# Patient Record
Sex: MALE | Race: WHITE | NOT HISPANIC OR LATINO | URBAN - METROPOLITAN AREA
[De-identification: names, ages, dates, MRNs, and addresses within clinical notes are randomized per-mention and may not be internally consistent; named-entity substitution may affect disease eponyms.]

---

## 2024-10-15 ENCOUNTER — EMERGENCY (EMERGENCY)
Facility: HOSPITAL | Age: 29
LOS: 0 days | Discharge: HOME | End: 2024-10-15
Attending: EMERGENCY MEDICINE
Payer: COMMERCIAL

## 2024-10-15 VITALS
HEART RATE: 80 BPM | SYSTOLIC BLOOD PRESSURE: 150 MMHG | DIASTOLIC BLOOD PRESSURE: 91 MMHG | RESPIRATION RATE: 18 BRPM | WEIGHT: 282.19 LBS | OXYGEN SATURATION: 98 % | TEMPERATURE: 98 F

## 2024-10-15 DIAGNOSIS — K08.89 OTHER SPECIFIED DISORDERS OF TEETH AND SUPPORTING STRUCTURES: ICD-10-CM

## 2024-10-15 PROCEDURE — 99283 EMERGENCY DEPT VISIT LOW MDM: CPT

## 2024-10-15 RX ORDER — AMOXICILLIN 250 MG/5ML
1 SUSPENSION, RECONSTITUTED, ORAL (ML) ORAL
Qty: 14 | Refills: 0
Start: 2024-10-15 | End: 2024-10-21

## 2024-10-15 RX ORDER — ACETAMINOPHEN 325 MG
650 TABLET ORAL ONCE
Refills: 0 | Status: COMPLETED | OUTPATIENT
Start: 2024-10-15 | End: 2024-10-15

## 2024-10-15 RX ADMIN — Medication 650 MILLIGRAM(S): at 15:19

## 2024-10-15 NOTE — ED PROVIDER NOTE - OBJECTIVE STATEMENT
Pt is a 29 y.o Male with no significant PMHx who presents to the ED with chief complaint of left upper wisdom tooth pain. Denies any fever chills, trauma facial swelling difficulty swallowing or speaking, trismus cp sob, neck pain abdominal pain n/v. Pt states he took Naproxen 500 approximately 2 hours pta with minimal relief in his dental pain.

## 2024-10-15 NOTE — ED PROVIDER NOTE - PATIENT PORTAL LINK FT
You can access the FollowMyHealth Patient Portal offered by [facility] by registering at the following website: [email or web address]. By joining [employer]’s FollowMyHealth portal, you will also be able to view your health information using other applications (apps) compatible with our system.

## 2024-10-15 NOTE — ED PROVIDER NOTE - NSFOLLOWUPCLINICS_GEN_ALL_ED_FT
[facility]  Dental  [address] [place] [id number]  Phone: [phone]  Fax:

## 2024-10-15 NOTE — ED PROVIDER NOTE - CLINICAL SUMMARY MEDICAL DECISION MAKING FREE TEXT BOX
VSS.  No signs of sepsis.  Tolerating PO.  left upper dental pain x 1 month.  No acute ED intervention.  Abx, NSAIDs and F/U with Dental. Strict return instructions discussed.

## 2024-10-15 NOTE — ED PROVIDER NOTE - NSFOLLOWUPINSTRUCTIONS_ED_ALL_ED_FT
You visited the Emergency Department for a dental issue. We recommend that you follow up with a dentist. If you have a private dentist, please contact them directly. If you do not have a dentist, we have a dental clinic that will do its best to accommodate you. To make an appointment, please call the dental clinic at [phone] and leave a voicemail or email them at [email or web address] for a response.     Dental Pain: What It Means  A close-up view of two teeth. One tooth is normal, and the other has tooth decay and an abscess.  Dental pain is often a sign that something is wrong with your teeth or gums. You can also have pain after a dental treatment. If you have dental pain, it's important to contact your dental care provider, especially if you don't know what's causing the pain.    Dental pain may hurt a lot or a little. It can be caused by many things, including:  Tooth decay. This is also called cavities or caries.  Infection.  An abscess. This is when the inner part of the tooth is filled with pus.  An injury or a crack in the tooth.  Gum disease or gums that move back and expose the root of a tooth.  Abnormal grinding or clenching of teeth.  Not taking good care of your teeth.  Sometimes the cause of pain isn't known.    You may have pain all the time, or it may come and go. It may happen only when you're:  Chewing.  Exposed to hot or cold temperatures.  Eating or drinking foods or drinks with a lot of sugar in them, such as soda or candy.  Follow these instructions at home:  Medicines    Take your medicines only as told.  If you were given antibiotics, take them as told. Do not stop taking them even if you start to feel better.  Eating and drinking    Avoid foods or drinks that cause you pain. These may include:  Very hot or very cold foods or drinks.  Sweet or sugary foods or drinks.  Managing pain and swelling    Bag of ice on a towel on the skin.  Use ice or an ice pack on the painful area of your face as told.  Place a towel between your skin and the ice.  Leave the ice on for 20 minutes, 2–3 times a day.  If your skin turns red, take off the ice right away to prevent skin damage. The risk of damage is higher if you can't feel pain, heat, or cold.  Brushing and flossing    Brush your teeth twice a day using a fluoride toothpaste.  Use a toothpaste made for sensitive teeth as told by your dental care provider.  Always use a soft toothbrush. This will help prevent irritation of your gums.  Floss your teeth at least once a day.  General instructions    Do not apply heat to the outside of your face.  To cleanse your mouth and help with any irritation and swelling in the painful area, you can try rinsing with salt water.  Swish and gargle with salt water and then spit it out. To make salt water, add a half to a whole spoonful of salt to a glass of warm water. Mix well.  You can repeat this every 2–3 hours for pain.  Contact a dental care provider if:  You have dental pain and you don't know why.  Your pain isn't controlled with medicines.  Your symptoms get worse.  You have new symptoms.  Get help right away if:  You can't open your mouth.  You're having trouble breathing or swallowing.  You have a fever.  Your face, neck, or jaw is swollen.  These symptoms may be an emergency. Call 911 right away.  Do not wait to see if the symptoms will go away.  Do not drive yourself to the hospital.  This information is not intended to replace advice given to you by your health care provider. Make sure you discuss any questions you have with your health care provider.

## 2024-10-15 NOTE — ED PROVIDER NOTE - MDM ORDERS SUBMITTED SELECTION
I went patient MyChart message back answering her question.    Jennifer Dalton, DNP, APRN, CNP    Medications

## 2024-10-15 NOTE — ED PROVIDER NOTE - PHYSICAL EXAMINATION
VITAL SIGNS: noted  CONSTITUTIONAL: Well-developed; well-nourished; in no acute distress  HEAD: Normocephalic; atraumatic  EYES: PERRL, EOM intact; conjunctiva and sclera clear  ENT: No nasal discharge;, MMM, oropharynx clear without tonsillar hypertrophy or exudates  NECK: Supple; non tender. No anterior cervical lymphadenopathy noted, no pta uvula midline no facial swelling no neck brawnyness. + ttp to left upper molar no cracked or loose teeth noted. No    CARD: S1, S2 normal; no murmurs, gallops, or rubs. Regular rate and rhythm  RESP: CTAB/L, no wheezes, rales or rhonchi  tenderness  EXT: Normal ROM.   NEURO: Awake and alert, oriented. Grossly unremarkable. No focal deficits.  SKIN: Skin exam is warm and dry, no acute rash
